# Patient Record
Sex: MALE | Race: WHITE | URBAN - METROPOLITAN AREA
[De-identification: names, ages, dates, MRNs, and addresses within clinical notes are randomized per-mention and may not be internally consistent; named-entity substitution may affect disease eponyms.]

---

## 2017-01-13 ENCOUNTER — OFFICE VISIT (OUTPATIENT)
Dept: NEUROLOGY | Facility: CLINIC | Age: 82
End: 2017-01-13

## 2017-01-13 VITALS — DIASTOLIC BLOOD PRESSURE: 66 MMHG | HEART RATE: 80 BPM | RESPIRATION RATE: 18 BRPM | SYSTOLIC BLOOD PRESSURE: 136 MMHG

## 2017-01-13 DIAGNOSIS — F02.80 LATE ONSET ALZHEIMER'S DISEASE WITHOUT BEHAVIORAL DISTURBANCE (H): Primary | ICD-10-CM

## 2017-01-13 DIAGNOSIS — G30.1 LATE ONSET ALZHEIMER'S DISEASE WITHOUT BEHAVIORAL DISTURBANCE (H): Primary | ICD-10-CM

## 2017-01-13 RX ORDER — MEMANTINE HYDROCHLORIDE 10 MG/1
10 TABLET ORAL 2 TIMES DAILY
Qty: 180 TABLET | Refills: 3 | Status: SHIPPED | OUTPATIENT
Start: 2017-01-13

## 2017-01-13 ASSESSMENT — PAIN SCALES - GENERAL: PAINLEVEL: NO PAIN (0)

## 2017-01-13 NOTE — Clinical Note
2017       RE: Madyson Wallis  [facility]  [address] [facility] [address]     Dear Colleague,    Thank you for referring your patient, Madyson Wallis, to the [facility] at [facility]. Please see a copy of my visit note below.    2017      Cameron Ramirez MD   Internal Med Specialties    [address]   [place] [address]      RE: Madyson Wallis   MRN: [id number]   : 1933      Dear Don:      I saw Madyson Wallis back.  He is accompanied by his son, Randal today.  His wife is not present.      Mr. Wallis is a patient with Alzheimer disease.      I saw him in November it was decided to try Namenda.  I have been concerned about using a cholinesterase inhibitor because of an episode of syncope that he had had in the past of undetermined etiology.      He is up to 15 mg of Namenda a day in divided doses.  He is tolerating the drug well.  Randal indicates there has been some positive changes, although he still has ongoing issues with his memory.  He is self-sufficient with his self-cares.      Examination reveals his heart rate is 80.  Blood pressure 136/66.  He is alert and cooperative.  He is quite conversant, although at times he will lose his train of thought.  He demonstrates an occasional problem with word finding.  I did not do formal Mini-Mental State testing today.  This does tend to upset him.      IMPRESSION:  Alzheimer disease.      PLAN:  He is going to increase the Namenda to 10 mg twice a day.      I am going to see him back in 6 months.      Sincerely,        Dez Man MD              D: 2017 11:58   T: 2017 15:25   MT: AKA      Name:     MADYSON WALLIS   MRN:      [id number]        Account:      [id number]   :      1933           Service Date: 2017      Document: [id number]

## 2017-01-13 NOTE — NURSING NOTE
Chief Complaint   Patient presents with     RECHECK     2 mo f/u, med check     Krishna Teresa, LPN

## 2017-01-13 NOTE — MR AVS SNAPSHOT
After Visit Summary   1/13/2017    Saleem Coles    MRN: [id number]           Patient Information     Date Of Birth          1/9/1933        Visit Information        Provider Department      1/13/2017 11:30 AM Dze Man MD [facility]        Today's Diagnoses     Late onset Alzheimer's disease without behavioral disturbance    -  1        Follow-ups after your visit        Follow-up notes from your care team     Discussed this visit Return in about 6 months (around 7/13/2017).      Who to contact     Please call your clinic at [phone] to:    Ask questions about your health    Make or cancel appointments    Discuss your medicines    Learn about your test results    Speak to your doctor   If you have compliments or concerns about an experience at your clinic, or if you wish to file a complaint, please contact [facility] Patient Relations at [phone] or email us at [email or web address]         Additional Information About Your Visit        MyChart Information     [employer] gives you secure access to your electronic health record. If you see a primary care provider, you can also send messages to your care team and make appointments. If you have questions, please call your primary care clinic.  If you do not have a primary care provider, please call [phone] and they will assist you.      [employer] is an electronic gateway that provides easy, online access to your medical records. With [employer], you can request a clinic appointment, read your test results, renew a prescription or communicate with your care team.     To access your existing account, please contact your [facility] or call [phone] for assistance.        Care EveryWhere ID     This is your Care EveryWhere ID. This could be used by other organizations to access your [facility] medical records  [id number]         Your Vitals Were     Pulse Respirations                80 18           Blood Pressure from Last 3 Encounters:   01/13/17 136/66   11/22/16 130/60   08/23/16 124/54    Weight from Last 3 Encounters:   No data found for Wt              Today, you had the following     No orders found for display         Today's Medication Changes          These changes are accurate as of: 1/13/17 11:46 AM.  If you have any questions, ask your nurse or doctor.               These medicines have changed or have updated prescriptions.        Dose/Directions    memantine 10 MG tablet   Commonly known as:  NAMENDA   This may have changed:    - medication strength  - how much to take  - how to take this  - when to take this  - additional instructions   Used for:  Late onset Alzheimer's disease without behavioral disturbance   Changed by:  Dez Man MD        Dose:  10 mg   Take 1 tablet (10 mg) by mouth 2 times daily   Quantity:  180 tablet   Refills:  3            Where to get your medicines      These medications were sent to [facility] [address] [address]     Phone:  [phone]    - memantine 10 MG tablet             Primary Care Provider Office Phone # Fax #    Cameron SOLIS James [phone]       [facility] [address] [facility] [address]  [facility] [address]        Thank you!     Thank you for choosing [facility]  for your care. Our goal is always to provide you with excellent care. Hearing back from our patients is one way we can continue to improve our services. Please take a few minutes to complete the written survey that you may receive in the mail after your visit with us. Thank you!             Your Updated Medication List - Protect others around you: Learn how to safely use, store and throw away your medicines at www.disposemymeds.org.          This list is  accurate as of: 1/13/17 11:46 AM.  Always use your most recent med list.                   Brand Name Dispense Instructions for use    CIALIS PO      Take 5 mg by mouth daily       LEVOTHYROXINE SODIUM PO      Take 25 mcg by mouth daily       memantine 10 MG tablet    NAMENDA    180 tablet    Take 1 tablet (10 mg) by mouth 2 times daily       metFORMIN 500 MG tablet    GLUCOPHAGE     Take 500 mg by mouth daily (with breakfast)       MULTIVITAMIN ADULT PO      Take 1 tablet by mouth daily       NIFEDIPINE PO      Take 30 mg by mouth daily       OMEPRAZOLE PO      Take 20 mg by mouth At Bedtime       simvastatin 10 MG tablet    ZOCOR     Take 10 mg by mouth At Bedtime       TAMSULOSIN HCL PO      Take 0.4 mg by mouth At Bedtime       vitamin D3 2000 UNITS Caps      Take 1 capsule by mouth daily

## 2017-01-13 NOTE — PROGRESS NOTES
2017      Cameron Ramirez MD   Internal Med Specialties    [address]   [place] [address]      RE: Madyson Wallis   MRN: [id number]   : 1933      Dear Jimmy:      I saw Madyson Wallis back.  He is accompanied by his son, Randal today.  His wife is not present.      Mr. Wallis is a patient with Alzheimer disease.      I saw him in November it was decided to try Namenda.  I have been concerned about using a cholinesterase inhibitor because of an episode of syncope that he had had in the past of undetermined etiology.      He is up to 15 mg of Namenda a day in divided doses.  He is tolerating the drug well.  Randal indicates there has been some positive changes, although he still has ongoing issues with his memory.  He is self-sufficient with his self-cares.      Examination reveals his heart rate is 80.  Blood pressure 136/66.  He is alert and cooperative.  He is quite conversant, although at times he will lose his train of thought.  He demonstrates an occasional problem with word finding.  I did not do formal Mini-Mental State testing today.  This does tend to upset him.      IMPRESSION:  Alzheimer disease.      PLAN:  He is going to increase the Namenda to 10 mg twice a day.      I am going to see him back in 6 months.     ADDENDUM 3/1/17: Spoke to son Randal. Patient hospitalized a few days after above visit. ? Bowel obstruction. Developed delirium while in hospital. Not back to his baseline and told Randal he may not get back. He was in W/C but now up walking.  Namenda stopped and will not restart now. He is now in a Memory Care unit.     Sincerely,        MD SARIAH Cavanaugh MD             D: 2017 11:58   T: 2017 15:25   MT: AKA      Name:     MADYSON WALLIS   MRN:      [id number]        Account:      [id number]   :      1933           Service Date: 2017      Document: [id number]

## 2017-01-24 ENCOUNTER — HOME CARE/HOSPICE - HEALTHEAST (OUTPATIENT)
Dept: HOSPICE | Facility: HOSPICE | Age: 82
End: 2017-01-24

## 2017-01-25 ENCOUNTER — HOME CARE/HOSPICE - HEALTHEAST (OUTPATIENT)
Dept: HOSPICE | Facility: HOSPICE | Age: 82
End: 2017-01-25

## 2017-05-25 ENCOUNTER — TELEPHONE (OUTPATIENT)
Dept: NEUROLOGY | Facility: CLINIC | Age: 82
End: 2017-05-25

## 2017-05-25 NOTE — TELEPHONE ENCOUNTER
Joni Coles, Saleem Coles's son called to give Dr Man an update. Saleem was released from the hospital to memory care. His cognitive function is not any better but he is walking andeating sold food.   Joni can be reached at [phone] and requests a call back.

## 2017-06-05 NOTE — TELEPHONE ENCOUNTER
Multiple attempts to reach son Joni have been unsuccessful. Left message to call back with a time I could reach him. CHRISTOPH Man

## 2017-07-10 ENCOUNTER — CARE COORDINATION (OUTPATIENT)
Dept: NEUROLOGY | Facility: CLINIC | Age: 82
End: 2017-07-10

## 2017-07-10 NOTE — PROGRESS NOTES
"Pt's son Randal is requesting a call back. He called to cancel the pt's f/u appt with Dr. Man for Alzheimer's and states that they feel that since there isn't cure for alzheimer's that the appt is \"pointless\". Randal would like his father to continue care with Dr. Man but would like to get advice from someone in order to help him talk to his siblings about this. Please call Randal to discuss.     Thank you!   -------------------------------------------------------  Tried calling Randal ( patient's son) at [phone].  I left a voice message asking him to call me back.  "

## 2017-08-21 ENCOUNTER — HOME CARE/HOSPICE - HEALTHEAST (OUTPATIENT)
Dept: HOSPICE | Facility: HOSPICE | Age: 82
End: 2017-08-21

## 2017-08-24 ENCOUNTER — COMMUNICATION - HEALTHEAST (OUTPATIENT)
Dept: SCHEDULING | Facility: CLINIC | Age: 82
End: 2017-08-24

## 2020-03-10 ENCOUNTER — HEALTH MAINTENANCE LETTER (OUTPATIENT)
Age: 85
End: 2020-03-10

## 2020-12-27 ENCOUNTER — HEALTH MAINTENANCE LETTER (OUTPATIENT)
Age: 85
End: 2020-12-27

## 2021-04-24 ENCOUNTER — HEALTH MAINTENANCE LETTER (OUTPATIENT)
Age: 86
End: 2021-04-24

## 2021-10-09 ENCOUNTER — HEALTH MAINTENANCE LETTER (OUTPATIENT)
Age: 86
End: 2021-10-09

## 2022-05-16 ENCOUNTER — HEALTH MAINTENANCE LETTER (OUTPATIENT)
Age: 87
End: 2022-05-16

## 2022-09-11 ENCOUNTER — HEALTH MAINTENANCE LETTER (OUTPATIENT)
Age: 87
End: 2022-09-11

## 2023-06-03 ENCOUNTER — HEALTH MAINTENANCE LETTER (OUTPATIENT)
Age: 88
End: 2023-06-03